# Patient Record
Sex: MALE | Race: WHITE
[De-identification: names, ages, dates, MRNs, and addresses within clinical notes are randomized per-mention and may not be internally consistent; named-entity substitution may affect disease eponyms.]

---
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
## 2022-09-07 ENCOUNTER — APPOINTMENT (OUTPATIENT)
Dept: PEDIATRICS | Facility: CLINIC | Age: 22
End: 2022-09-07

## 2022-09-16 ENCOUNTER — APPOINTMENT (OUTPATIENT)
Dept: PEDIATRICS | Facility: CLINIC | Age: 22
End: 2022-09-16

## 2022-09-16 VITALS
WEIGHT: 215 LBS | HEART RATE: 78 BPM | BODY MASS INDEX: 25.91 KG/M2 | HEIGHT: 76.5 IN | SYSTOLIC BLOOD PRESSURE: 118 MMHG | DIASTOLIC BLOOD PRESSURE: 64 MMHG

## 2022-09-16 DIAGNOSIS — Z00.00 ENCOUNTER FOR GENERAL ADULT MEDICAL EXAMINATION W/OUT ABNORMAL FINDINGS: ICD-10-CM

## 2022-09-16 PROCEDURE — 96160 PT-FOCUSED HLTH RISK ASSMT: CPT | Mod: 59

## 2022-09-16 PROCEDURE — 99173 VISUAL ACUITY SCREEN: CPT | Mod: 59

## 2022-09-16 PROCEDURE — 99395 PREV VISIT EST AGE 18-39: CPT | Mod: 25

## 2022-09-16 PROCEDURE — 92551 PURE TONE HEARING TEST AIR: CPT

## 2022-09-16 PROCEDURE — 96127 BRIEF EMOTIONAL/BEHAV ASSMT: CPT

## 2022-09-16 RX ORDER — LEVOCETIRIZINE DIHYDROCHLORIDE 5 MG/1
5 TABLET, FILM COATED ORAL
Refills: 0 | Status: DISCONTINUED | COMMUNITY
End: 2022-09-16

## 2022-09-16 NOTE — DISCUSSION/SUMMARY
[Met privately with the adolescent for part of the office visit?] : Met privately with the adolescent for part of the office visit? Yes [Adolescent demonstrates understanding of his/her conditions and how to take prescribed medications?] : Adolescent demonstrates understanding of his/her conditions and how to take prescribed medications? Yes [Adolescent asks questions during each office  visit and participates in the care plan?] : Adolescent asks questions during each office visit and participates in the care plan? Yes [Adolescent is competent in independently making appointments, filling prescriptions, following up on referrals, and seeking emergency services, as needed?] : Adolescent is competent in independently making appointments, filling prescriptions, following up on referrals, and seeking emergency services, as needed? Yes [Normal Growth] : growth [Normal Development] : development  [No Elimination Concerns] : elimination [Continue Regimen] : feeding [No Skin Concerns] : skin [Normal Sleep Pattern] : sleep [None] : no medical problems [Anticipatory Guidance Given] : Anticipatory guidance addressed as per the history of present illness section [No Medications] : ~He/She~ is not on any medications [Patient] : patient [Parent/Guardian] : Parent/Guardian [Discussed using Follow My Health to access health records and communicate with the adolescent's care team?] : Discussed using Follow My Health to access health records and communicate with the adolescent's care team? No [FreeTextEntry1] : Continue balanced diet with all food groups. Brush teeth twice a day with toothbrush. Recommend visit to dentist. Maintain consistent daily routines and sleep schedule. Personal hygiene, puberty, and sexual health reviewed. Risky behaviors assessed. School discussed. Limit screen time to no more than 2 hours per day. Encourage physical activity.\par Cardiac questionnaire reviewed, occasional SOB with extreme running\par 5-2-1-0 questionnaire reviewed and I discussed components of 5-2-1-0 healthy living with patient and family.  Recommended 5 servings of fruits and vegetables a day, less than 2 hours of screen time per day, 1 hour of exercise per day and zero sugar sweetened beverages. Parent(s) have no issues or concerns.\par Discussed in the preferred language of English\par Return 1 year for routine well child check.\par \par Tdap discussed - patient wants to wait to discuss with mom\par  [Physical Growth and Development] : physical growth and development [Social and Academic Competence] : social and academic competence [Emotional Well-Being] : emotional well-being [Risk Reduction] : risk reduction [Violence and Injury Prevention] : violence and injury prevention [Full Activity without restrictions including Physical Education & Athletics] : Full Activity without restrictions including Physical Education & Athletics

## 2022-09-16 NOTE — HISTORY OF PRESENT ILLNESS
[Up to date] : Up to date [Grade: ____] : Grade: [unfilled] [Eats regular meals including adequate fruits and vegetables] : eats regular meals including adequate fruits and vegetables [No] : No cigarette smoke exposure [Yes] : Patient has had sexual intercourse. [HIV Screening Declined] : HIV Screening Declined [Has ways to cope with stress] : has ways to cope with stress [Displays self-confidence] : displays self-confidence [With Teen] : teen [Sleep Concerns] : no sleep concerns [Normal Performance] : normal performance [Uses electronic nicotine delivery system] : does not use electronic nicotine delivery system [Uses tobacco] : does not use tobacco [Exposure to tobacco] : no exposure to tobacco [Uses drugs] : does not use drugs  [Drinks alcohol] : does not drink alcohol [Has problems with sleep] : does not have problems with sleep [Gets depressed, anxious, or irritable/has mood swings] : does not get depressed, anxious, or irritable/has mood swings [Has thought about hurting self or considered suicide] : has not thought about hurting self or considered suicide [FreeTextEntry7] : 21 yr well visit  [de-identified] : none [de-identified] : basketball, golf [de-identified] : + condoms [FreeTextEntry1] : covid 1/22 - no symptoms.   no CP/SOB with activity since

## 2022-09-16 NOTE — PHYSICAL EXAM
[Alert] : alert [No Acute Distress] : no acute distress [Normocephalic] : normocephalic [EOMI Bilateral] : EOMI bilateral [Clear tympanic membranes with bony landmarks and light reflex present bilaterally] : clear tympanic membranes with bony landmarks and light reflex present bilaterally  [Pink Nasal Mucosa] : pink nasal mucosa [Nonerythematous Oropharynx] : nonerythematous oropharynx [Supple, full passive range of motion] : supple, full passive range of motion [No Palpable Masses] : no palpable masses [Clear to Auscultation Bilaterally] : clear to auscultation bilaterally [Regular Rate and Rhythm] : regular rate and rhythm [Normal S1, S2 audible] : normal S1, S2 audible [No Murmurs] : no murmurs [+2 Femoral Pulses] : +2 femoral pulses [Soft] : soft [NonTender] : non tender [Non Distended] : non distended [Normoactive Bowel Sounds] : normoactive bowel sounds [No Hepatomegaly] : no hepatomegaly [No Splenomegaly] : no splenomegaly [Demond: _____] : [place] [unfilled] [Circumcised] : circumcised [Bilateral descended testes] : bilateral descended testes [No Testicular Masses] : no testicular masses [No Abnormal Lymph Nodes Palpated] : no abnormal lymph nodes palpated [Normal Muscle Tone] : normal muscle tone [No Gait Asymmetry] : no gait asymmetry [No pain or deformities with palpation of bone, muscles, joints] : no pain or deformities with palpation of bone, muscles, joints [+2 Patella DTR] : +2 patella DTR [Cranial Nerves Grossly Intact] : cranial nerves grossly intact [No Rash or Lesions] : no rash or lesions [de-identified] : minimal L thoracic and R lumbar elevation

## 2022-12-26 ENCOUNTER — APPOINTMENT (OUTPATIENT)
Dept: PEDIATRICS | Facility: CLINIC | Age: 22
End: 2022-12-26
Payer: COMMERCIAL

## 2022-12-26 VITALS — HEART RATE: 100 BPM | TEMPERATURE: 98.2 F | OXYGEN SATURATION: 99 %

## 2022-12-26 DIAGNOSIS — R05.9 COUGH, UNSPECIFIED: ICD-10-CM

## 2022-12-26 LAB — SARS-COV-2 AG RESP QL IA.RAPID: POSITIVE

## 2022-12-26 PROCEDURE — 87811 SARS-COV-2 COVID19 W/OPTIC: CPT | Mod: QW

## 2022-12-26 PROCEDURE — 99213 OFFICE O/P EST LOW 20 MIN: CPT | Mod: 25

## 2022-12-26 NOTE — HISTORY OF PRESENT ILLNESS
[de-identified] : cough and congestion x 1 month, has felt even worse past couple of days  [FreeTextEntry6] : Body aches and tired x 3 days\par Cough and runny nose on and off x 1 month\par Works at [employer]\par No fever or temp > 100\par H/O covid past summer\par No ear pain\par No sore throat\par No chest pain, wheezing or dyspnea\par Normal appetite, No vomiting, No diarrhea\par No smell or taste issues\par No sick contacts\par No Covid contacts or exposure\par No recent travel or contact with travelers\par

## 2022-12-26 NOTE — PHYSICAL EXAM
[Clear Rhinorrhea] : clear rhinorrhea [Warm, Well Perfused x4] : warm, well perfused x4 [NL] : warm, clear [FreeTextEntry5] : Pink, noninjected conjunctiva, no discharge [de-identified] : No exudate, no vesicles, no petechiae noted [FreeTextEntry7] : No wheeze, no rales, no retractions, no rhonchi heard

## 2022-12-26 NOTE — DISCUSSION/SUMMARY
[FreeTextEntry1] : Symptomatic treatment\par Maintain adequate hydration \par Cool mist humidifier\par Saline nose drops and bulb suctioning if needed\par Stressed handwashing and infection control \par Pay close observation for new or worsening symptoms especially cardiac or respiratory complaints\par Instructed to return to office if condition worsens or new symptoms arise\par Go to ER or UC if condition worsens or unable to to get to the office or after office hours\par Patient verbalizes understanding of care plan\par Recheck prn\par

## 2023-07-10 NOTE — DISCUSSION/SUMMARY
[Normal Growth] : growth [Continue Regimen] : feeding [Normal Development] : development  [No Elimination Concerns] : elimination [No Skin Concerns] : skin [Normal Sleep Pattern] : sleep [Physical Growth and Development] : physical growth and development [Anticipatory Guidance Given] : Anticipatory guidance addressed as per the history of present illness section [None] : no medical problems [Risk Reduction] : risk reduction [Emotional Well-Being] : emotional well-being [Social and Academic Competence] : social and academic competence [No Vaccines] : no vaccines needed [No Medications] : ~He/She~ is not on any medications [Violence and Injury Prevention] : violence and injury prevention [Patient] : patient [Parent/Guardian] : Parent/Guardian [FreeTextEntry1] : Continue balanced diet with all food groups. Brush teeth twice a day with toothbrush. Recommend visit to dentist. Maintain consistent daily routines and sleep schedule. Personal hygiene, puberty, and sexual health reviewed. Risky behaviors assessed. School discussed. Limit screen time to no more than 2 hours per day. Encourage physical activity.\par Return 1 year for routine well child check.\par Cardiac questionnaire reviewed, NO issues.\par 5-2-1-0 questionnaire reviewed and I discussed components of 5-2-1-0 healthy living with patient and family.  Recommended 5 servings of fruits and vegetables a day, less than 2 hours of screen time per day, 1 hour of exercise per day and zero sugar sweetened beverages. Parent(s) have no issues or concerns.\par Discussed in the preferred language of English\par  prior ED record. see hpi

## 2023-11-17 ENCOUNTER — APPOINTMENT (OUTPATIENT)
Dept: PEDIATRICS | Facility: CLINIC | Age: 23
End: 2023-11-17
Payer: COMMERCIAL

## 2023-11-17 VITALS — TEMPERATURE: 98.3 F | OXYGEN SATURATION: 96 %

## 2023-11-17 DIAGNOSIS — Z71.89 OTHER SPECIFIED COUNSELING: ICD-10-CM

## 2023-11-17 DIAGNOSIS — B27.90 INFECTIOUS MONONUCLEOSIS, UNSPECIFIED W/OUT COMPLICATION: ICD-10-CM

## 2023-11-17 DIAGNOSIS — R53.83 OTHER FATIGUE: ICD-10-CM

## 2023-11-17 DIAGNOSIS — U07.1 COVID-19: ICD-10-CM

## 2023-11-17 DIAGNOSIS — J02.9 ACUTE PHARYNGITIS, UNSPECIFIED: ICD-10-CM

## 2023-11-17 DIAGNOSIS — R52 PAIN, UNSPECIFIED: ICD-10-CM

## 2023-11-17 DIAGNOSIS — R22.1 LOCALIZED SWELLING, MASS AND LUMP, NECK: ICD-10-CM

## 2023-11-17 DIAGNOSIS — R53.81 OTHER MALAISE: ICD-10-CM

## 2023-11-17 DIAGNOSIS — R68.83 CHILLS (WITHOUT FEVER): ICD-10-CM

## 2023-11-17 LAB
POCT - MONO RAPID TEST: POSITIVE
S PYO AG SPEC QL IA: NEGATIVE
SARS-COV-2 AG RESP QL IA.RAPID: NEGATIVE

## 2023-11-17 PROCEDURE — 86308 HETEROPHILE ANTIBODY SCREEN: CPT | Mod: QW

## 2023-11-17 PROCEDURE — 87811 SARS-COV-2 COVID19 W/OPTIC: CPT | Mod: QW

## 2023-11-17 PROCEDURE — 99215 OFFICE O/P EST HI 40 MIN: CPT | Mod: 25

## 2023-11-17 PROCEDURE — 87880 STREP A ASSAY W/OPTIC: CPT | Mod: QW
